# Patient Record
Sex: FEMALE | Race: WHITE | Employment: FULL TIME | URBAN - METROPOLITAN AREA
[De-identification: names, ages, dates, MRNs, and addresses within clinical notes are randomized per-mention and may not be internally consistent; named-entity substitution may affect disease eponyms.]

---

## 2017-04-13 ENCOUNTER — HOSPITAL ENCOUNTER (OUTPATIENT)
Dept: PHYSICAL THERAPY | Age: 47
Discharge: HOME | End: 2017-04-13
Payer: COMMERCIAL

## 2017-04-13 PROCEDURE — 97161 PT EVAL LOW COMPLEX 20 MIN: CPT

## 2017-04-13 PROCEDURE — 97140 MANUAL THERAPY 1/> REGIONS: CPT

## 2017-04-13 NOTE — PROGRESS NOTES
PHYSICAL THERAPY - DAILY TREATMENT NOTE    Patient Name: Mercy Ortega        Date: 2017  : 1970   YES Patient  Verified  Visit #:      of     Insurance: Payor: Sandeep Garnica / Plan: [place] [employer] P[place] / Product Type: PPO /      In time: 8:05 A Out time: 8:55 A   Total Treatment Time: 50     Medicare Time Tracking (below)   Total Timed Codes (min):  NA 1:1 Treatment Time:  NA     TREATMENT AREA =  Myalgia, L UE, L c/s    SUBJECTIVE  Pain Level (on 0 to 10 scale):  2  / 10   Medication Changes/New allergies or changes in medical history, any new surgeries or procedures?     NO    If yes, update Summary List   Subjective Functional Status/Changes:  []  No changes reported     See POC          OBJECTIVE  Modalities Rationale:     decrease pain to improve patient's ability to return to pain-free ADLs   min [] Estim, type/location:                                      []  att     []  unatt     []  w/US     []  w/ice    []  w/heat    min []  Mechanical Traction: type/lbs                   []  pro   []  sup   []  int   []  cont    []  before manual    []  after manual    min []  Ultrasound, settings/location:      min []  Iontophoresis w/ dexamethasone, location:                                               []  take home patch       []  in clinic   10 min []  Ice     [x]  Heat    location/position: Supine to upper t/s, L UT    min []  Vasopneumatic Device, press/temp:     min []  Other:    [] Skin assessment post-treatment (if applicable):    []  intact    []  redness- no adverse reaction     []redness  adverse reaction:         min Therapeutic Exercise:  [x]  See flow sheet   Rationale:        15 min Manual Therapy: MFR to L UT, L lower c/s paraspinals, pec minor stetch in supine   Rationale:      decrease pain, increase ROM and decrease trigger points to improve patient's ability to return to pain-free use of L UE with ADLs     min Therapeutic Activity:    Rationale:      min Neuromuscular Re-ed:    Rationale: min Gait Training:    Rationale:       min Patient Education:  YES  Reviewed HEP   []  Progressed/Changed HEP based on: Other Objective/Functional Measures:    See POC     Post Treatment Pain Level (on 0 to 10) scale:   1  / 10     ASSESSMENT  Assessment/Changes in Function:   See POC      []  See Progress Note/Recertification   Patient will continue to benefit from skilled PT services to analyze and modify body mechanics/ergonomics, assess and modify postural abnormalities and instruct in home and community integration to attain remaining goals.    Progress toward goals / Updated goals:    Progressing towards goals established at [address] [facility] [id number]  [x]  Upgrade activities as tolerated YES Continue plan of care   []  Discharge due to :    []  Other:      Therapist: Albert Beckman PT    Date: 4/13/2017 Time: 9:47 AM     Future Appointments  Date Time Provider Ella Gruber   4/17/2017 6:00 PM [place]deep [place], [place] [facility]   4/20/2017 6:00 PM Sundeep [place] [facility]   4/25/2017 4:30 PM Albert Rk, PT [facility]   4/27/2017 6:00 PM Albert Sports, [place] [facility]   5/2/2017 4:30 PM Albert Sports, PT [facility]   5/4/2017 6:00 PM Sundeep Grace, [place] [facility]   5/9/2017 4:30 PM Albert Rk, PT [facility]   5/11/2017 6:00 PM Sundeep Bolivare, [place] [facility]   5/16/2017 4:30 PM Albert Sports, PT [facility]   5/18/2017 6:00 PM Sundeepivon Bolivare, [place] [facility]   5/23/2017 4:30 PM Albert Sports, [place] [facility]   5/25/2017 6:00 PM Sundeepivon Bolivare, [place] [facility]

## 2017-04-13 NOTE — PROGRESS NOTES
Ezequiel Velazquez [address]  [facility] AT [address] [place], [place] [address]mago Mcneil [facility], [address] ,[address], [address]  Phone: [phone]  Fax: [phone] / STATEMENT OF MEDICAL NECESSITY FOR PHYSICAL THERAPY SERVICES  Patient Name: Usama Scott : 1970   Medical   Diagnosis: Myalgia [M79.1] Treatment Diagnosis: L sided chest pain, c/s, L UE pain   Onset Date: 2016     Referral Source: Laith Rhoades MD [facility]): 2017   Prior Hospitalization: See medical history Provider #: [id number]   Prior Level of Function: Sx free with ADLs    Comorbidities: R hearing impairment, hypothyroid   Medications: Verified on Patient Summary List   The Plan of Care and following information is based on the information from the initial evaluation.   ==================================================================================  Assessment / key information:  Patient is a 40 y.o. female who presents to [facility] with Dx of myalgia. Patient reports initial onset of sx last  which were of unknown etiology. She reports c/o L sided chest pain which worsened into L UE. Patient reports sx are fairly constant in nature in L chest, L UE to level of elbow as well as anterior neck pain & into L jaw line with worsening of sx with stress otherwise she is unable to relate any consistent aggravating factors and she does not believe that this is related to activity. She reports having multiple Dx studies such as to r/o cardiac, GI dysfunction as well as DVT. She also reports recently having MRI of t/s this past Feb which revealed a hemangioma at T11. She is unable to relate any relieving factors. She reports having most recent lidocaine injections on 17 which no significant change in sx reported.  Average reported pain level at 2/10, 8/10 at worst & 2/10 at best. Upon objective evaluation patient demonstrates full pain-free c/s, t/s & L UE AROM with no c/o pain throughout all planes. [facility] no c/o pain upon MMT. No change in sx with repeated movements of c/s, t/s was NA today. Mod TTP along L UT, L ant scalenes, SCM, mild TTP along L lev scap & suboccipital mm. Patient can benefit from PT interventions to improve posture, decrease pain, to facilitate ADLs & overall functional status.   ==================================================================================  Eval Complexity: History MEDIUM  Complexity : 1-2 comorbidities / personal factors will impact the outcome/ POC ;  Examination  HIGH Complexity : 4+ Standardized tests and measures addressing body structure, function, activity limitation and / or participation in recreation ; Presentation LOW Complexity : Stable, uncomplicated ;  Decision Making MEDIUM Complexity : FOTO score of 26-74; Overall Complexity LOW   Problem List: pain affecting function, decrease ROM, decrease strength, decrease ADL/ functional abilitiies, decrease activity tolerance, decrease flexibility/ joint mobility, decrease transfer abilities and other FOTO 53 points   Treatment Plan may include any combination of the following: Therapeutic exercise, Therapeutic activities, Neuromuscular re-education, Physical agent/modality, Gait/balance training, Manual therapy, Aquatic therapy, Patient education, Self Care training, Functional mobility training, Home safety training, Stair training and Other: FOTO 53 points  Patient / Family readiness to learn indicated by: asking questions, trying to perform skills and interest  Persons(s) to be included in education: patient (P)  Barriers to Learning/Limitations: None  Measures taken:    Patient Goal (s): \"no pain\"   Patient self reported health status: good  Rehabilitation Potential: good   Short Term Goals: To be accomplished in  2  weeks:  1) Establish HEP to prevent further disability.     2) Patient will report decreased c/o pain to < or = 5/10 at worst to facilitate ADLs with manageable sx. 3) Improve FOTO score from 53 points to > or = 57 points indicating improved tolerance with ADLs.  Long Term Goals: To be accomplished in  4  weeks:  1) Improve FOTO score from 57 points to > or = 61 points indicating improved tolerance with ADLs. 2) Patient to report 50% improvement in overall function in preparation for return to recreational activities with manageable sx. 3) Patient to be independent & compliant with HEP in preparation for D/C. Frequency / Duration:   Patient to be seen  2-3  times per week for 4  weeks:  Patient / Caregiver education and instruction: self care, activity modification, brace/ splint application and exercises  G-Codes (GP): NA  Therapist Signature: OTONIEL Ramirez, cert MDT Date: 1/85/4638   Certification Period: NA Time: 8:08 AM   ===========================================================================================  I certify that the above Physical Therapy Services are being furnished while the patient is under my care. I agree with the treatment plan and certify that this therapy is necessary. Physician Signature:        Date:       Time:     Please sign and return to [facility] or you may fax the signed copy to [phone]. Thank you.

## 2017-04-17 ENCOUNTER — HOSPITAL ENCOUNTER (OUTPATIENT)
Dept: PHYSICAL THERAPY | Age: 47
Discharge: HOME | End: 2017-04-17
Payer: COMMERCIAL

## 2017-04-17 PROCEDURE — 97110 THERAPEUTIC EXERCISES: CPT | Performed by: PHYSICAL THERAPIST

## 2017-04-17 PROCEDURE — 97140 MANUAL THERAPY 1/> REGIONS: CPT | Performed by: PHYSICAL THERAPIST

## 2017-04-17 PROCEDURE — 97530 THERAPEUTIC ACTIVITIES: CPT | Performed by: PHYSICAL THERAPIST

## 2017-04-17 NOTE — PROGRESS NOTES
PHYSICAL THERAPY - DAILY TREATMENT NOTE    Patient Name: Rafael Valdez        Date: 2017  : 1970   YES Patient  Verified  Visit #:   2   of   12  Insurance: Payor: Sarwat Bone / Plan: [place] [employer] PPO / Product Type: PPO /      In time: 6:05 Out time: 7:00   Total Treatment Time: 55     Medicare Time Tracking (below)   Total Timed Codes (min):  na 1:1 Treatment Time:  na     TREATMENT AREA =  Myalgia, L Neck/UE    SUBJECTIVE  Pain Level (on 0 to 10 scale):  2  / 10   Medication Changes/New allergies or changes in medical history, any new surgeries or procedures? NO    If yes, update Summary List   Subjective Functional Status/Changes:  []  No changes reported     Pt reports getting left chest and neck/shoulder pain without any pattern.           OBJECTIVE  Modalities Rationale:     decrease pain and increase tissue extensibility to improve patient's ability to increase functional use of left UE   min [] Estim, type/location:                                      []  att     []  unatt     []  w/US     []  w/ice    []  w/heat    min []  Mechanical Traction: type/lbs                   []  pro   []  sup   []  int   []  cont    []  before manual    []  after manual    min []  Ultrasound, settings/location:      min []  Iontophoresis w/ dexamethasone, location:                                               []  take home patch       []  in clinic   10 min []  Ice     [x]  Heat    location/position: Neck - supine after treatment    min []  Vasopneumatic Device, press/temp:     min []  Other:    [] Skin assessment post-treatment (if applicable):    []  intact    []  redness- no adverse reaction     []redness  adverse reaction:        30 min Therapeutic Exercise:  [x]  See flow sheet   Rationale:      increase ROM, increase strength and improve coordination to improve the patients ability to regain functional use of left UE     15 min Manual Therapy: pec minor stretching, STM to L>R C/T area, inhibitive distraction   Rationale:      decrease pain, increase ROM and increase tissue extensibility to improve patient's ability to regain functional use of left UE     min Therapeutic Activity:         min Neuromuscular Re-ed:         min Gait Training:       min Patient Education:  YES  Reviewed HEP   []  Progressed/Changed HEP based on: Other Objective/Functional Measures:    Pt has increased tightness of left pec minor and palpable tenderness/tension of the same     Post Treatment Pain Level (on 0 to 10) scale:   2  / 10     ASSESSMENT  Assessment/Changes in Function:     Pt encouraged to perform corner/dorrway stretch throughout her day to promote relaxation and improve flexibility/posturla correction. Pt also encouraged to obtain headset phone to prevent her from using left head/shoudler to hold phone as pt is deaf in right ear and only uses phone in the left. []  See Progress Note/Recertification   Patient will continue to benefit from skilled PT services to modify and progress therapeutic interventions, address functional mobility deficits, address ROM deficits, address strength deficits, analyze and address soft tissue restrictions, analyze and cue movement patterns, analyze and modify body mechanics/ergonomics and assess and modify postural abnormalities to attain remaining goals. Progress toward goals / Updated goals: Will progress postural correction as tolerated to manage symptoms.      PLAN  [x]  Upgrade activities as tolerated YES Continue plan of care   []  Discharge due to :    []  Other:      Therapist: Kang Garduno PT    Date: 4/17/2017 Time: 10:02 AM     Future Appointments  Date Time Provider Ella Gruber   4/17/2017 6:00 PM Kang [place]uno [place] [facility]   4/20/2017 6:00 PM Kang Garduno PT [facility]   4/25/2017 4:30 PM Wayne Garcia, PT [facility]   4/27/2017 6:00 PM Wayne Limb, PT [facility]   5/2/2017 4:30 PM Wayne Garcia, PT [facility]   5/4/2017 6:00 PM Tamika Li, [place] [facility]   5/9/2017 4:30 PM Vanessa Hernandez, PT [facility]   5/11/2017 6:00 PM Tamika Li, PT [facility]   5/16/2017 4:30 PM Vanessa Hernandez, PT [facility]   5/18/2017 6:00 PM Tamika Li, PT [facility]   5/23/2017 4:30 PM Vanessa Hernandez, PT [facility]   5/25/2017 6:00 PM Tamika Li, PT [facility]

## 2017-04-20 ENCOUNTER — APPOINTMENT (OUTPATIENT)
Dept: PHYSICAL THERAPY | Age: 47
End: 2017-04-20
Payer: COMMERCIAL

## 2017-04-25 ENCOUNTER — HOSPITAL ENCOUNTER (OUTPATIENT)
Dept: PHYSICAL THERAPY | Age: 47
Discharge: HOME | End: 2017-04-25
Payer: COMMERCIAL

## 2017-04-25 PROCEDURE — 97140 MANUAL THERAPY 1/> REGIONS: CPT

## 2017-04-25 PROCEDURE — 97110 THERAPEUTIC EXERCISES: CPT

## 2017-04-25 NOTE — PROGRESS NOTES
Ezequiel Velazquez [address]  [facility]  [facility], [address] ,[address], [address]  Phone: [phone]  Fax: [phone]  PROGRESS NOTE  Patient Name: Ishan Ulloa : 1970   Treatment/Medical Diagnosis: Myalgia [M79.1]   Referral Source: Hamzah Mckeon MD     Date of Initial Visit: 17 Attended Visits: 3 Missed Visits: 0     SUMMARY OF TREATMENT  Therapeutic exercise including ROM, stretching, gentle strengthening, stabilization training, postural ed, patient education, HEP instruction, MHP, manual therapy including MFR, STM, manual stretching. CURRENT STATUS  Mrs. Shannan Nieto has made good progress with PT & reports intermittent c/o L sided c/s pain as well as pain into L pectoral region. Good tolerance to manual therapy & manual stretching. She reports her sx into L elbow have resolved with c/o pain into L pectoral region decreased in intensity & frequency since starting PT. Please see below for other improvements with PT. Goal/Measure of Progress Goal Met? 1.  Establish HEP to prevent further disability. Status at last Eval: NA Current Status: HEP established  yes   2. Patient will report decreased c/o pain to < or = 5/10 at worst to facilitate ADLs with manageable sx. Status at last Eval: 8/10 at worst Current Status: 4/10 at worst  yes   3. Improve FOTO score from 53 points to > or = 57 points indicating improved tolerance with ADLs. Status at last Eval: 53 Current Status: 68 yes     New Goals to be achieved in __4__  weeks:  1. Improve FOTO score from 68 points to > or = 70 points indicating improved tolerance with ADLs in regards to c/s.   2.  Patient to report 50% improvement in overall function in preparation for return to recreational activities with manageable sx.    3.  Patient to be independent & compliant with HEP in preparation for D/C.   4.  Patient will return to recreational fitness program with no exacerbation of c/s or L UE sx/     G-Codes (GP): NA  RECOMMENDATIONS  Patient to continue with PT for up to 3-4 weeks in order to progress towards achieving all LTGs. If you have any questions/comments please contact us directly at [phone]. Thank you for allowing us to assist in the care of your patient. Therapist Signature: OTONIEL Holguin, cert MDT Date: 6/28/0510     Time: 6:53 PM   NOTE TO PHYSICIAN:  PLEASE COMPLETE THE ORDERS BELOW AND FAX TO   [facility]: [phone]. If you are unable to process this request in 24 hours please contact our office: [phone].    ___ I have read the above report and request that my patient continue as recommended.   ___ I have read the above report and request that my patient continue therapy with the following changes/special instructions:_________________________________________________________   ___ I have read the above report and request that my patient be discharged from therapy.      Physician Signature:        Date:       Time:

## 2017-04-25 NOTE — PROGRESS NOTES
PHYSICAL THERAPY - DAILY TREATMENT NOTE    Patient Name: Glendy Warren        Date: 2017  : 1970   YES Patient  Verified  Visit #:   3   of   12  Insurance: Payor: Carolann Hernandez / Plan: [place] OPTIM[employer] PPO / Product Type: PPO /      In time: 4:30 P Out time: 5:40 P   Total Treatment Time: 60     Medicare Time Tracking (below)   Total Timed Codes (min):  NA 1:1 Treatment Time:  NA     TREATMENT AREA =  Myalgia    SUBJECTIVE  Pain Level (on 0 to 10 scale):  3  / 10   Medication Changes/New allergies or changes in medical history, any new surgeries or procedures?     NO    If yes, update Summary List   Subjective Functional Status/Changes:  []  No changes reported     See PN           OBJECTIVE  Modalities Rationale:     decrease pain and increase tissue extensibility to improve patient's ability to return to pain-free with ADLs    min [] Estim, type/location:                                      []  att     []  unatt     []  w/US     []  w/ice    []  w/heat    min []  Mechanical Traction: type/lbs                   []  pro   []  sup   []  int   []  cont    []  before manual    []  after manual    min []  Ultrasound, settings/location:      min []  Iontophoresis w/ dexamethasone, location:                                               []  take home patch       []  in clinic   10 min []  Ice     [x]  Heat    location/position: Supine to c/s     min []  Vasopneumatic Device, press/temp:     min []  Other:    [] Skin assessment post-treatment (if applicable):    []  intact    []  redness- no adverse reaction      []redness  adverse reaction:        35 min Therapeutic Exercise:  [x]  See flow sheet   Rationale:      increase ROM and increase strength to improve the patients ability to return to pain-free sitting at work      15 min Manual Therapy: STM/TPR to B upper c/s PS, pec minor stretch on L in supine    Rationale:      decrease pain, increase ROM, increase tissue extensibility, decrease trigger points and increase postural awareness to improve patient's ability to tolerate return to light housework      min Therapeutic Activity:    Rationale:      min Neuromuscular Re-ed:    Rationale:        min Gait Training:    Rationale:       min Patient Education:  YES  Reviewed HEP   []  Progressed/Changed HEP based on: Other Objective/Functional Measures:    FOTO 68     Post Treatment Pain Level (on 0 to 10) scale:   2  / 10     ASSESSMENT  Assessment/Changes in Function:   Good tolerance to today's treatment & reduction in sx since starting PT     []  See Progress Note/Recertification   Patient will continue to benefit from skilled PT services to modify and progress therapeutic interventions, address functional mobility deficits, address ROM deficits, address strength deficits, assess and modify postural abnormalities and instruct in home and community integration to attain remaining goals.    Progress toward goals / Updated goals:    Progressing towards LTGs, all STGs met      PLAN  [x]  Upgrade activities as tolerated YES Continue plan of care   []  Discharge due to :    [x]  Other: PN to MD     Therapist: Marianna Sacks, PT    Date: 4/25/2017 Time: 6:27 PM     Future Appointments  Date Time Provider Ella Gruber   4/27/2017 6:00 PM Marianna Sacks, [place] [facility]   5/2/2017 5:30 PM Eloina Overall, [place] [facility]   5/4/2017 6:00 PM Eloina Overall, [place] [facility]   5/9/2017 4:30 PM Marianna Sacks, PT [facility]   5/11/2017 6:00 PM Eloina Overall, PT [facility]   5/16/2017 4:30 PM Marianna Sacks, PT [facility]   5/18/2017 6:00 PM Eloina Overall, [place] [facility]   5/23/2017 4:30 PM Marianna Sacks, [place] [facility]   5/25/2017 6:00 PM Eloina Overall, [place] [facility]

## 2017-04-27 ENCOUNTER — HOSPITAL ENCOUNTER (OUTPATIENT)
Dept: PHYSICAL THERAPY | Age: 47
Discharge: HOME | End: 2017-04-27
Payer: COMMERCIAL

## 2017-04-27 PROCEDURE — 97110 THERAPEUTIC EXERCISES: CPT

## 2017-04-27 PROCEDURE — 97140 MANUAL THERAPY 1/> REGIONS: CPT

## 2017-04-27 NOTE — PROGRESS NOTES
PHYSICAL THERAPY - DAILY TREATMENT NOTE    Patient Name: Mary Ann Pritchard        Date: 2017  : 1970   YES Patient  Verified  Visit #:   4   of   12  Insurance: Payor: Génesis Mai / Plan: [employer] / Product Type: PPO /      In time: 6:05 P Out time: 7:05 P   Total Treatment Time: 55     Medicare Time Tracking (below)   Total Timed Codes (min):  NA 1:1 Treatment Time:  NA     TREATMENT AREA =  Myalgia    SUBJECTIVE  Pain Level (on 0 to 10 scale):  2  / 10   Medication Changes/New allergies or changes in medical history, any new surgeries or procedures?     NO    If yes, update Summary List   Subjective Functional Status/Changes:  []  No changes reported     Patient reports that she will have f/u with MD & may not get injections because she has been feeling better with PT.           OBJECTIVE  Modalities Rationale:     decrease pain to improve patient's ability to return to pain-free ADLs    min [] Estim, type/location:                                      []  att     []  unatt     []  w/US     []  w/ice    []  w/heat    min []  Mechanical Traction: type/lbs                   []  pro   []  sup   []  int   []  cont    []  before manual    []  after manual    min []  Ultrasound, settings/location:      min []  Iontophoresis w/ dexamethasone, location:                                               []  take home patch       []  in clinic   10 min []  Ice     [x]  Heat    location/position: Supine to c/s     min []  Vasopneumatic Device, press/temp:     min []  Other:    [] Skin assessment post-treatment (if applicable):    []  intact    []  redness- no adverse reaction      []redness  adverse reaction:        30 min Therapeutic Exercise:  [x]  See flow sheet   Rationale:      increase ROM and increase strength to improve the patients ability to return to light lifting at home      15 min Manual Therapy: STM/MFR to L>R upper/lower c/s paraspinals, L pec minor release, SOR In supine   Rationale:      decrease pain, increase ROM, decrease trigger points and increase postural awareness to improve patient's ability to return to pain-free sitting at work      min Therapeutic Activity:    Rationale:      min Neuromuscular Re-ed:    Rationale:        min Gait Training:    Rationale:       min Patient Education:  YES  Reviewed HEP   []  Progressed/Changed HEP based on: Other Objective/Functional Measures:    TE per flow sheet     Post Treatment Pain Level (on 0 to 10) scale:   1 / 10     ASSESSMENT  Assessment/Changes in Function:   Good tolerance to today's treatment, no increase in pain     []  See Progress Note/Recertification   Patient will continue to benefit from skilled PT services to modify and progress therapeutic interventions, address functional mobility deficits, address ROM deficits, address strength deficits, assess and modify postural abnormalities and instruct in home and community integration to attain remaining goals.    Progress toward goals / Updated goals:    Progressing towards newly established LTGs      PLAN  [x]  Upgrade activities as tolerated YES Continue plan of care   []  Discharge due to :    []  Other:      Therapist: Kane Arzate PT    Date: 4/27/2017 Time: 7:36 PM     Future Appointments  Date Time Provider Ella Gruber   5/2/2017 5:30 PM Celeste Staff, PT [facility]   5/4/2017 6:00 PM Celeste Staff, PT [facility]   5/9/2017 4:30 PM Kane Arzate, PT [facility]   5/11/2017 6:00 PM Celeste Staff, PT [facility]   5/16/2017 4:30 PM Kane Arzate, PT [facility]   5/18/2017 6:00 PM Celeste Staff, PT [facility]   5/23/2017 4:30 PM Kane Arzate, PT [facility]   5/25/2017 6:00 PM Celeste Staff, PT [facility]

## 2017-05-02 ENCOUNTER — HOSPITAL ENCOUNTER (OUTPATIENT)
Dept: PHYSICAL THERAPY | Age: 47
Discharge: HOME | End: 2017-05-02
Payer: COMMERCIAL

## 2017-05-02 PROCEDURE — 97140 MANUAL THERAPY 1/> REGIONS: CPT | Performed by: PHYSICAL THERAPIST

## 2017-05-02 PROCEDURE — 97110 THERAPEUTIC EXERCISES: CPT | Performed by: PHYSICAL THERAPIST

## 2017-05-02 NOTE — PROGRESS NOTES
PHYSICAL THERAPY - DAILY TREATMENT NOTE    Patient Name: Laura Barahona        Date: 2017  : 1970   YES Patient  Verified  Visit #:     Insurance: Payor: Fariba Bhardwaj / Plan: [employer] PPO / Product Type: PPO /      In time: 5:30 Out time: 6:30   Total Treatment Time: 50     Medicare Time Tracking (below)   Total Timed Codes (min):  na 1:1 Treatment Time:  na     TREATMENT AREA =  Myalgia    SUBJECTIVE  Pain Level (on 0 to 10 scale):  0  / 10   Medication Changes/New allergies or changes in medical history, any new surgeries or procedures? NO    If yes, update Summary List   Subjective Functional Status/Changes:  []  No changes reported     Pt reports having less pain overall, and is able to manage symptoms when they start. She has obtained and is trying to use headset phone more at work. She saw Dr Floyd Chacon and will continue without receiving intramuscular injections.          OBJECTIVE  Modalities Rationale:     decrease pain and increase tissue extensibility to improve patient's ability to self-manage symptoms   min [] Estim, type/location:                                      []  att     []  unatt     []  w/US     []  w/ice    []  w/heat    min []  Mechanical Traction: type/lbs                   []  pro   []  sup   []  int   []  cont    []  before manual    []  after manual    min []  Ultrasound, settings/location:      min []  Iontophoresis w/ dexamethasone, location:                                               []  take home patch       []  in clinic   10 min []  Ice     [x]  Heat    location/position: Neck - supine after treatment    min []  Vasopneumatic Device, press/temp:     min []  Other:    [] Skin assessment post-treatment (if applicable):    []  intact    []  redness- no adverse reaction     []redness  adverse reaction:        30 min Therapeutic Exercise:  [x]  See flow sheet   Rationale:      increase ROM, increase strength and improve coordination to improve the patients ability to regain functional activity/positional tolerance     10 min Manual therapy: STM to upper thoracic area, pec minor stretch, and inhibitive distraction        min Neuromuscular Re-ed:         min Gait Training:       min Patient Education:  YES  Reviewed HEP   []  Progressed/Changed HEP based on: Other Objective/Functional Measures: Added supine over full FR today, and added standing T-Band rows      Post Treatment Pain Level (on 0 to 10) scale:   0  / 10     ASSESSMENT  Assessment/Changes in Function:     Pt had mild soreness after T-band rows, but subsided with rest.  Issued RTB for home use. []  See Progress Note/Recertification   Patient will continue to benefit from skilled PT services to modify and progress therapeutic interventions, address functional mobility deficits, address ROM deficits, address strength deficits, analyze and address soft tissue restrictions, analyze and cue movement patterns, analyze and modify body mechanics/ergonomics and assess and modify postural abnormalities to attain remaining goals. Progress toward goals / Updated goals: Will continue to progress postural correction/strengthening as tolerated.      PLAN  [x]  Upgrade activities as tolerated YES Continue plan of care   []  Discharge due to :    []  Other:      Therapist: Tawnya Pratt PT    Date: 5/2/2017 Time: 9:58 AM     Future Appointments  Date Time Provider Ella Gruber   5/2/2017 5:30 PM Tawnya Pratt, PT [facility]   5/4/2017 6:00 PM Tawnya Pratt, PT [facility]   5/9/2017 4:30 PM Telma Chaney PT [facility]   5/11/2017 6:00 PM Tawnya Pratt, PT [facility]   5/16/2017 4:30 PM Telma Chaney PT [facility]   5/18/2017 6:00 PM Tawnya Pratt, PT [facility]   5/23/2017 4:30 PM Telma Chaney, PT [facility]   5/25/2017 6:00 PM Tawnya Pratt, PT [facility]

## 2017-05-04 ENCOUNTER — APPOINTMENT (OUTPATIENT)
Dept: PHYSICAL THERAPY | Age: 47
End: 2017-05-04
Payer: COMMERCIAL

## 2017-05-09 ENCOUNTER — APPOINTMENT (OUTPATIENT)
Dept: PHYSICAL THERAPY | Age: 47
End: 2017-05-09
Payer: COMMERCIAL

## 2017-05-11 ENCOUNTER — APPOINTMENT (OUTPATIENT)
Dept: PHYSICAL THERAPY | Age: 47
End: 2017-05-11
Payer: COMMERCIAL

## 2017-05-16 ENCOUNTER — APPOINTMENT (OUTPATIENT)
Dept: PHYSICAL THERAPY | Age: 47
End: 2017-05-16
Payer: COMMERCIAL

## 2017-05-18 ENCOUNTER — APPOINTMENT (OUTPATIENT)
Dept: PHYSICAL THERAPY | Age: 47
End: 2017-05-18
Payer: COMMERCIAL

## 2017-05-23 ENCOUNTER — HOSPITAL ENCOUNTER (OUTPATIENT)
Dept: PHYSICAL THERAPY | Age: 47
Discharge: HOME | End: 2017-05-23
Payer: COMMERCIAL

## 2017-05-23 PROCEDURE — 97110 THERAPEUTIC EXERCISES: CPT

## 2017-05-23 PROCEDURE — 97140 MANUAL THERAPY 1/> REGIONS: CPT

## 2017-05-23 NOTE — PROGRESS NOTES
PHYSICAL THERAPY - DAILY TREATMENT NOTE    Patient Name: Mary Ann Pritchard        Date: 2017  : 1970   YES Patient  Verified  Visit #:     Insurance: Payor: Génesis Mai / Plan: [employer] PPO / Product Type: PPO /      In time: 4:30 P Out time: 5:38 P   Total Treatment Time: 60     Medicare Time Tracking (below)   Total Timed Codes (min):  NA 1:1 Treatment Time:  NA     TREATMENT AREA =  Myalgia    SUBJECTIVE  Pain Level (on 0 to 10 scale):  0 / 10   Medication Changes/New allergies or changes in medical history, any new surgeries or procedures? NO    If yes, update Summary List   Subjective Functional Status/Changes:  []  No changes reported     Patient reports that she has been feeling much better & is more aware of her posture.   She RS her f/u with MD to 17         OBJECTIVE  Modalities Rationale:     decrease pain to improve patient's ability to return to pain-free ADLs    min [] Estim, type/location:                                      []  att     []  unatt     []  w/US     []  w/ice    []  w/heat    min []  Mechanical Traction: type/lbs                   []  pro   []  sup   []  int   []  cont    []  before manual    []  after manual    min []  Ultrasound, settings/location:      min []  Iontophoresis w/ dexamethasone, location:                                               []  take home patch       []  in clinic   10 min []  Ice     [x]  Heat    location/position: Supine to c/s     min []  Vasopneumatic Device, press/temp:     min []  Other:    [] Skin assessment post-treatment (if applicable):    []  intact    []  redness- no adverse reaction      []redness  adverse reaction:        35 min Therapeutic Exercise:  [x]  See flow sheet   Rationale:      increase ROM and increase strength to improve the patients ability to return to light lifting at home      15 min Manual Therapy: STM/MFR to L>R upper/lower c/s paraspinals, L pec minor release, SOR In supine   Rationale: decrease pain, increase ROM, decrease trigger points and increase postural awareness to improve patient's ability to return to pain-free sitting at work      min Therapeutic Activity:    Rationale:      min Neuromuscular Re-ed:    Rationale:        min Gait Training:    Rationale:       min Patient Education:  Zachary Chavez   []  Progressed/Changed HEP based on: Other Objective/Functional Measures: Added Tband ext in standing with RTB     Post Treatment Pain Level (on 0 to 10) scale:   0  / 10     ASSESSMENT  Assessment/Changes in Function:   Good tolerance to strength progressions, improved posture with sitting c/s stretches     []  See Progress Note/Recertification   Patient will continue to benefit from skilled PT services to modify and progress therapeutic interventions, address functional mobility deficits, address ROM deficits, address strength deficits, assess and modify postural abnormalities and instruct in home and community integration to attain remaining goals. Progress toward goals / Updated goals:    Progressing towards LTG 1     PLAN  [x]  Upgrade activities as tolerated YES Continue plan of care   []  Discharge due to :    []  Other:      Therapist: Marianna Sacks, PT    Date: 5/23/2017 Time: 5:45 PM     No future appointments.

## 2017-05-25 ENCOUNTER — APPOINTMENT (OUTPATIENT)
Dept: PHYSICAL THERAPY | Age: 47
End: 2017-05-25
Payer: COMMERCIAL

## 2017-06-23 NOTE — PROGRESS NOTES
[address]  [facility] AT [address] [place] [address]  [address]uan [facility] [address], [address] ,[address], [address]  Phone: [phone]  Fax: [phone] SUMMARY  Patient Name: Jazmin Terry : 1970   Treatment/Medical Diagnosis: Myalgia [M79.1]   Referral Source: Walter Garcia MD     Date of Initial Visit: 17 Attended Visits: 6 Missed Visits: 1     SUMMARY OF TREATMENT  Therapeutic exercise including ROM, stretching, gentle strengthening, stabilization training, postural ed, patient education, HEP instruction, MHP, manual therapy including MFR, STM, manual stretching. CURRENT STATUS  Ms. Bertha Montano was last seen for PT on 17 she had reported a significant reduction in her sx & an improved sense of postural awareness since starting PT. She had reported 0/10 pain level for the last two PT Rx sessions. Otherwise she was unable to be formally re-assessed prior to DC. No further contact has been made with clinic to continue with PT, therefore patient to be discharged to home program.    Goal/Measure of Progress Goal Met? 1. Improve FOTO score from 68 points to > or = 70 points indicating improved tolerance with ADLs in regards to c/s. Status at last Eval: 76 Current Status: Unable to be re-assessed n/a   2. Patient to report 50% improvement in overall function in preparation for return to recreational activities with manageable sx. Status at last Eval: NA Current Status: Unable to be re-assessed n/a   3. Patient to be independent & compliant with HEP in preparation for D/C. Status at last Eval: HEP established Current Status: Good compliance with HEP yes   4. Patient will return to recreational fitness program with no exacerbation of c/s or L UE sx   Status at last Eval: NA Current Status: Unable to be re-assessed n/a     RECOMMENDATIONS  Discontinue therapy due to lack of attendance or compliance.   Goals partially met    If you have any questions/comments please contact us directly at [phone]. Thank you for allowing us to assist in the care of your patient.     Therapist Signature: OTONIEL Torres, chandler MDT Date: 6-54-57     Time: 2:40 PM